# Patient Record
Sex: MALE | Race: BLACK OR AFRICAN AMERICAN | Employment: UNEMPLOYED | URBAN - METROPOLITAN AREA
[De-identification: names, ages, dates, MRNs, and addresses within clinical notes are randomized per-mention and may not be internally consistent; named-entity substitution may affect disease eponyms.]

---

## 2019-02-17 PROBLEM — F29 ATYPICAL PSYCHOSIS (HCC): Status: ACTIVE | Noted: 2017-02-17

## 2019-02-17 PROBLEM — I10 BENIGN ESSENTIAL HTN: Status: ACTIVE | Noted: 2019-02-17

## 2019-02-17 PROBLEM — F31.9 BIPOLAR 1 DISORDER (HCC): Status: ACTIVE | Noted: 2019-02-17

## 2019-02-17 PROBLEM — F20.9 SCHIZOPHRENIA (HCC): Status: ACTIVE | Noted: 2019-02-17

## 2019-02-17 PROBLEM — F15.10 METHAMPHETAMINE ABUSE (HCC): Status: ACTIVE | Noted: 2019-02-17

## 2019-02-17 PROBLEM — F12.10 MARIJUANA ABUSE, CONTINUOUS: Status: ACTIVE | Noted: 2017-02-17

## 2019-02-17 PROBLEM — F16.10 ECSTASY ABUSE (HCC): Status: ACTIVE | Noted: 2017-02-17

## 2019-02-18 PROBLEM — F15.10 METHAMPHETAMINE ABUSE (HCC): Status: RESOLVED | Noted: 2019-02-17 | Resolved: 2019-02-18

## 2019-02-18 PROBLEM — F16.10 ECSTASY ABUSE (HCC): Status: RESOLVED | Noted: 2017-02-17 | Resolved: 2019-02-18

## 2019-02-18 PROBLEM — F12.10 MARIJUANA ABUSE, CONTINUOUS: Status: RESOLVED | Noted: 2017-02-17 | Resolved: 2019-02-18

## 2019-02-18 PROBLEM — K21.9 GASTROESOPHAGEAL REFLUX DISEASE WITHOUT ESOPHAGITIS: Status: ACTIVE | Noted: 2019-02-18

## 2019-02-18 PROBLEM — F20.9 SCHIZOPHRENIA (HCC): Status: RESOLVED | Noted: 2019-02-17 | Resolved: 2019-02-18

## 2019-02-18 PROBLEM — Z72.0 TOBACCO ABUSE: Status: ACTIVE | Noted: 2019-02-18

## 2019-02-24 PROBLEM — R00.0 TACHYCARDIA: Status: ACTIVE | Noted: 2019-02-24

## 2019-02-24 PROBLEM — Z23 NEED FOR VACCINATION FOR STREP PNEUMONIAE + INFLUENZA: Status: ACTIVE | Noted: 2019-02-24

## 2019-02-24 PROBLEM — E66.3 OVERWEIGHT (BMI 25.0-29.9): Status: ACTIVE | Noted: 2019-02-24

## 2019-02-24 PROBLEM — D64.9 ANEMIA: Status: ACTIVE | Noted: 2019-02-24

## 2019-02-24 PROBLEM — F19.11 HISTORY OF SUBSTANCE ABUSE (HCC): Status: ACTIVE | Noted: 2019-02-24

## 2019-05-21 ENCOUNTER — HOSPITAL ENCOUNTER (EMERGENCY)
Age: 24
Discharge: HOME | End: 2019-05-21
Attending: EMERGENCY MEDICINE
Payer: MEDICAID

## 2019-05-21 VITALS
RESPIRATION RATE: 16 BRPM | TEMPERATURE: 98.1 F | SYSTOLIC BLOOD PRESSURE: 130 MMHG | HEART RATE: 88 BPM | OXYGEN SATURATION: 98 % | DIASTOLIC BLOOD PRESSURE: 84 MMHG

## 2019-05-21 DIAGNOSIS — R46.89 BEHAVIORAL CHANGE: Primary | ICD-10-CM

## 2019-05-21 PROCEDURE — 99284 EMERGENCY DEPT VISIT MOD MDM: CPT | Performed by: EMERGENCY MEDICINE

## 2019-05-21 NOTE — PROGRESS NOTES
Met with patient in the ER per MD request.  Patient states he is originally from [place] but had been living in [place] with his dad and then 3 years ago came to [place] (because he states he was \"hard headed\" and \"didn't listen to his dad\") to look for his mom. States he lived with his grandparents here until they passed. Patient states he has schizophrenia / didn't take his medications last night. Patient states he was living with someone named Asaf Burdick / but recently left.

## 2019-05-21 NOTE — PROGRESS NOTES
Patient called his father in [place]. CM spoke with father Angela Perez [phone]). Per father, patient does have dx of schizophrenia. Per father, patient's mother also has schizophrenia and bipolar. States patient was living with him in [place] for awhile but kept getting on drugs, then running away. Per father, patient has two more years of probation in [place] so he cannot leave [place] and come to [place]. Father states Asaf Burdick, took patient in a couple of years ago and tries to help him but Asaf Burdick stated to him that patient recently became mad, broke things at his house and left. Father states patient has been in and out of different hospitals. Father gave HEATHER Hernandez's contact number.

## 2019-05-21 NOTE — ED PROVIDER NOTES
68-year-old male with a history of schizophrenia and some polysubstance abuse presenting for concerns about shelter. States that he is homeless now. He's been staying with some people who were not his family and he feels like it's a bad, unsafe environment. EMS brought him in for weakness he states he is just tired because it on the heat all day. He does report that he sees [facility] and he's been prescribed nighttime medications for schizophrenia and did not take his dose last night but otherwise has been doing okay. He has not been drinking or doing drugs today. He denies homicidal or suicidal ideation. His family is originally from [facility] and he's been talking to his family in [place]. He states that they're willing to take him back but he's been \"hardheaded\" and has not gone back himself. The history is provided by the patient. Other   This is a new problem. The current episode started yesterday. The problem occurs constantly. The problem has not changed since onset.        Past Medical History:   Diagnosis Date    Allergic rhinitis     Anxiety     Asthma     childhood    Asthma     Benign essential HTN 2/17/2019    Depression     Gastroesophageal reflux disease without esophagitis 2/18/2019    GERD (gastroesophageal reflux disease)     Seasonal allergic reaction        Past Surgical History:   Procedure Laterality Date    HX HERNIA REPAIR  2015         Family History:   Problem Relation Age of Onset    Hypertension Mother     Depression Mother     Diabetes Father     Hypertension Father        Social History     Socioeconomic History    Marital status: SINGLE     Spouse name: Not on file    Number of children: Not on file    Years of education: Not on file    Highest education level: Not on file   Occupational History    Not on file   Social Needs    Financial resource strain: Not on file    Food insecurity:     Worry: Not on file     Inability: Not on file  Transportation needs:     Medical: Not on file     Non-medical: Not on file   Tobacco Use    Smoking status: Current Every Day Smoker     Types: Cigarettes     Start date: 2/18/2017    Smokeless tobacco: Never Used   Substance and Sexual Activity    Alcohol use: No     Frequency: Never    Drug use: No    Sexual activity: Not on file   Lifestyle    Physical activity:     Days per week: Not on file     Minutes per session: Not on file    Stress: Not on file   Relationships    Social connections:     Talks on phone: Not on file     Gets together: Not on file     Attends [employer] service: Not on file     Active member of club or organization: Not on file     Attends meetings of clubs or organizations: Not on file     Relationship status: Not on file    Intimate partner violence:     Fear of current or ex partner: Not on file     Emotionally abused: Not on file     Physically abused: Not on file     Forced sexual activity: Not on file   Other Topics Concern    Not on file   Social History Narrative    Not on file         ALLERGIES: Patient has no known allergies. Review of Systems   Constitutional: Positive for fatigue. Psychiatric/Behavioral: The patient is nervous/anxious. All other systems reviewed and are negative. Vitals:    05/21/19 1324   BP: 130/84   Pulse: 88   Resp: 16   Temp: 98.1 °F (36.7 °C)   SpO2: 98%            Physical Exam   Constitutional: He is oriented to person, place, and time. He appears well-developed and well-nourished. HENT:   Head: Normocephalic and atraumatic. Eyes: Pupils are equal, round, and reactive to light. Conjunctivae and EOM are normal.   Neck: Normal range of motion. Neck supple. Cardiovascular: Normal rate, regular rhythm, normal heart sounds and intact distal pulses. Pulmonary/Chest: Effort normal and breath sounds normal.   Abdominal: Soft. Bowel sounds are normal.   Musculoskeletal: Normal range of motion. He exhibits no deformity. Neurological: He is alert and oriented to person, place, and time. No cranial nerve deficit. Skin: Skin is warm and dry. Psychiatric: His mood appears not anxious. His affect is not angry, not blunt, not labile and not inappropriate. His speech is not rapid and/or pressured, not delayed, not tangential and not slurred. He is agitated. He is not aggressive, not hyperactive, not slowed, not withdrawn, not actively hallucinating and not combative. Thought content is not paranoid and not delusional. He does not express impulsivity. He does not exhibit a depressed mood. He expresses no homicidal and no suicidal ideation. He expresses no suicidal plans and no homicidal plans. He is communicative. Patient's affect is flat. Answers questions very directly He is attentive. Nursing note and vitals reviewed. MDM  Number of Diagnoses or Management Options  Diagnosis management comments: 82-year-old male presenting for concerns about where he is going to stay because he left the place that he felt was unsafe and not a good environment for him. At this time I do not think patient needs any labs or testing. I discussed the case with  and she will speak with him to see what can be done. Risk of Complications, Morbidity, and/or Mortality  Presenting problems: low  Diagnostic procedures: low  Management options: low  General comments: Case management discussed situation with the patient's parents. Apparently he is on probation here in [place] for 2 more years he cannot go back to [place] yet. He also has been living with someone named Maris Zurita and they apparently had an argument. We had the patient call [facility] for the situation and Maris Zurita will come pick him up.     Patient Progress  Patient progress: improved         Procedures

## 2019-05-21 NOTE — ED NOTES
I have reviewed discharge instructions with the patient. The patient verbalized understanding. Patient left ED via Discharge Method: ambulatory to Home with self. Ride coming to waiting room to pick him up. Opportunity for questions and clarification provided. Patient given 0 scripts. To continue your aftercare when you leave the hospital, you may receive an automated call from our care team to check in on how you are doing. This is a free service and part of our promise to provide the best care and service to meet your aftercare needs.  If you have questions, or wish to unsubscribe from this service please call [phone]. Thank you for Choosing our [address] [place] [address] Emergency Department.

## 2019-05-21 NOTE — PROGRESS NOTES
Discussed with patient that he is on probation and cant leave [place] at this time (patient did not offer this information on initial conversation but acknowledges it now). CM had patient call [facility] to see if he could come back there if MD discharges him. Patient called / CM also talked to Austin Man. Austin Man confirmed that patient did get mad and leave but states he will come pick him up from the ER. MD aware.

## 2019-05-21 NOTE — ED TRIAGE NOTES
Patient states he is out of his medication for schizoprenia for 24 hours and has been feeling tired and weak. Patient states he is also looking for help finding somewhere to stay because he is homeless.

## 2021-03-22 ENCOUNTER — APPOINTMENT (RX ONLY)
Dept: URBAN - METROPOLITAN AREA CLINIC 98 | Facility: CLINIC | Age: 26
Setting detail: DERMATOLOGY
End: 2021-03-22

## 2021-03-22 DIAGNOSIS — Z00.8 ENCOUNTER FOR OTHER GENERAL EXAMINATION: ICD-10-CM

## 2021-03-22 DIAGNOSIS — L50.1 IDIOPATHIC URTICARIA: ICD-10-CM

## 2021-03-22 DIAGNOSIS — L85.3 XEROSIS CUTIS: ICD-10-CM

## 2021-03-22 PROCEDURE — 99072 ADDL SUPL MATRL&STAF TM PHE: CPT

## 2021-03-22 PROCEDURE — ? PRESCRIPTION

## 2021-03-22 PROCEDURE — ? PHE RELATED SUPPLIES PROVIDED/DISPENSED

## 2021-03-22 PROCEDURE — ? COUNSELING

## 2021-03-22 PROCEDURE — 99204 OFFICE O/P NEW MOD 45 MIN: CPT

## 2021-03-22 RX ORDER — TRIAMCINOLONE ACETONIDE 1 MG/G
OINTMENT TOPICAL
Qty: 1 | Refills: 0 | Status: ERX | COMMUNITY
Start: 2021-03-22

## 2021-03-22 RX ORDER — CETIRIZINE HCL 1 MG/ML
SOLUTION, ORAL ORAL
Qty: 30 | Refills: 1 | Status: ERX | COMMUNITY
Start: 2021-03-22

## 2021-03-22 RX ADMIN — Medication: at 00:00

## 2021-03-22 RX ADMIN — TRIAMCINOLONE ACETONIDE: 1 OINTMENT TOPICAL at 00:00

## 2021-03-22 ASSESSMENT — LOCATION DETAILED DESCRIPTION DERM: LOCATION DETAILED: LEFT MEDIAL UPPER BACK

## 2021-03-22 ASSESSMENT — LOCATION SIMPLE DESCRIPTION DERM: LOCATION SIMPLE: LEFT UPPER BACK

## 2021-03-22 ASSESSMENT — LOCATION ZONE DERM: LOCATION ZONE: TRUNK
